# Patient Record
Sex: FEMALE | Race: WHITE | URBAN - METROPOLITAN AREA
[De-identification: names, ages, dates, MRNs, and addresses within clinical notes are randomized per-mention and may not be internally consistent; named-entity substitution may affect disease eponyms.]

---

## 2017-01-03 RX ORDER — SIMVASTATIN 10 MG/1
10 TABLET, FILM COATED ORAL
Qty: 30 TAB | Refills: 1 | Status: SHIPPED | OUTPATIENT
Start: 2017-01-03

## 2017-01-03 RX ORDER — DULOXETIN HYDROCHLORIDE 60 MG/1
CAPSULE, DELAYED RELEASE ORAL
Qty: 30 CAP | Refills: 1 | Status: SHIPPED | OUTPATIENT
Start: 2017-01-03

## 2017-01-26 RX ORDER — MIRTAZAPINE 45 MG/1
TABLET, FILM COATED ORAL
Qty: 30 TAB | Refills: 1 | Status: SHIPPED | OUTPATIENT
Start: 2017-01-26 | End: 2017-03-28 | Stop reason: SDUPTHER

## 2017-10-26 ENCOUNTER — OFFICE VISIT (OUTPATIENT)
Dept: FAMILY MEDICINE CLINIC | Age: 57
End: 2017-10-26

## 2017-10-26 VITALS
RESPIRATION RATE: 20 BRPM | HEART RATE: 98 BPM | WEIGHT: 122.2 LBS | SYSTOLIC BLOOD PRESSURE: 112 MMHG | BODY MASS INDEX: 22.49 KG/M2 | HEIGHT: 62 IN | TEMPERATURE: 98.2 F | OXYGEN SATURATION: 98 % | DIASTOLIC BLOOD PRESSURE: 62 MMHG

## 2017-10-26 DIAGNOSIS — Z12.39 BREAST CANCER SCREENING: ICD-10-CM

## 2017-10-26 DIAGNOSIS — E55.9 HYPOVITAMINOSIS D: ICD-10-CM

## 2017-10-26 DIAGNOSIS — Z12.11 COLON CANCER SCREENING: ICD-10-CM

## 2017-10-26 DIAGNOSIS — Z01.818 PREOP EXAMINATION: Primary | ICD-10-CM

## 2017-10-26 DIAGNOSIS — H54.7 VISION IMPAIRMENT: ICD-10-CM

## 2017-10-26 DIAGNOSIS — E78.00 PURE HYPERCHOLESTEROLEMIA: ICD-10-CM

## 2017-10-26 DIAGNOSIS — Z00.00 ANNUAL PHYSICAL EXAM: ICD-10-CM

## 2017-10-26 DIAGNOSIS — Z78.0 POSTMENOPAUSAL: ICD-10-CM

## 2017-10-26 RX ORDER — DEXTROAMPHETAMINE SACCHARATE, AMPHETAMINE ASPARTATE, DEXTROAMPHETAMINE SULFATE AND AMPHETAMINE SULFATE 5; 5; 5; 5 MG/1; MG/1; MG/1; MG/1
20 TABLET ORAL 2 TIMES DAILY
COMMUNITY

## 2017-10-26 RX ORDER — CLONAZEPAM 0.5 MG/1
TABLET ORAL
COMMUNITY

## 2017-10-26 RX ORDER — LAMOTRIGINE 150 MG/1
TABLET ORAL DAILY
COMMUNITY

## 2017-10-26 NOTE — PATIENT INSTRUCTIONS
High Cholesterol: Care Instructions  Your Care Instructions    Cholesterol is a type of fat in your blood. It is needed for many body functions, such as making new cells. Cholesterol is made by your body. It also comes from food you eat. High cholesterol means that you have too much of the fat in your blood. This raises your risk of a heart attack and stroke. LDL and HDL are part of your total cholesterol. LDL is the \"bad\" cholesterol. High LDL can raise your risk for heart disease, heart attack, and stroke. HDL is the \"good\" cholesterol. It helps clear bad cholesterol from the body. High HDL is linked with a lower risk of heart disease, heart attack, and stroke. Your cholesterol levels help your doctor find out your risk for having a heart attack or stroke. You and your doctor can talk about whether you need to lower your risk and what treatment is best for you. A heart-healthy lifestyle along with medicines can help lower your cholesterol and your risk. The way you choose to lower your risk will depend on how high your risk is for heart attack and stroke. It will also depend on how you feel about taking medicines. Follow-up care is a key part of your treatment and safety. Be sure to make and go to all appointments, and call your doctor if you are having problems. It's also a good idea to know your test results and keep a list of the medicines you take. How can you care for yourself at home? · Eat a variety of foods every day. Good choices include fruits, vegetables, whole grains (like oatmeal), dried beans and peas, nuts and seeds, soy products (like tofu), and fat-free or low-fat dairy products. · Replace butter, margarine, and hydrogenated or partially hydrogenated oils with olive and canola oils. (Canola oil margarine without trans fat is fine.)  · Replace red meat with fish, poultry, and soy protein (like tofu). · Limit processed and packaged foods like chips, crackers, and cookies.   · Bake, broil, or steam foods. Don't valdovinos them. · Be physically active. Get at least 30 minutes of exercise on most days of the week. Walking is a good choice. You also may want to do other activities, such as running, swimming, cycling, or playing tennis or team sports. · Stay at a healthy weight or lose weight by making the changes in eating and physical activity listed above. Losing just a small amount of weight, even 5 to 10 pounds, can reduce your risk for having a heart attack or stroke. · Do not smoke. When should you call for help? Watch closely for changes in your health, and be sure to contact your doctor if:  ? · You need help making lifestyle changes. ? · You have questions about your medicine. Where can you learn more? Go to http://ernie-theodore.info/. Enter T536 in the search box to learn more about \"High Cholesterol: Care Instructions. \"  Current as of: September 21, 2016  Content Version: 11.4  © 3910-4842 Healthwise, Incorporated. Care instructions adapted under license by [employer] (which disclaims liability or warranty for this information). If you have questions about a medical condition or this instruction, always ask your healthcare professional. [place] [address] any warranty or liability for your use of this information.

## 2017-10-26 NOTE — PROGRESS NOTES
Isha Martin is a 62 y.o. female is here for a pre op for brow lift. 1. Have you been to the ER, urgent care clinic since your last visit? Hospitalized since your last visit?urgent care for pulled muscle     2. Have you seen or consulted any other health care providers outside of the [address] [place] [address] since your last visit? Include any pap smears or colon screening. [email or web address]      Health Maintenance Due   Topic Date Due    Hepatitis C Screening  1960    PAP AKA CERVICAL CYTOLOGY  08/04/1981    FOBT Q 1 YEAR AGE 50-75  08/04/2010    INFLUENZA AGE 9 TO ADULT  08/01/2017     Already gpt flu shot.

## 2017-10-26 NOTE — MR AVS SNAPSHOT
Visit Information Date & Time Provider Department Dept. Phone Encounter #  
 10/26/2017 10:15 AM Shakir Carter, [facility] [phone] [id number] Upcoming Health Maintenance Date Due Hepatitis C Screening 1960 PAP AKA CERVICAL CYTOLOGY 8/4/1981 FOBT Q 1 YEAR AGE 50-75 8/4/2010 INFLUENZA AGE 9 TO ADULT 8/1/2017 BREAST CANCER SCRN MAMMOGRAM 4/28/2018 DTaP/Tdap/Td series (2 - Td) 5/13/2020 Allergies as of 10/26/2017  Review Complete On: 10/26/2017 By: Rita Swift LPN Severity Noted Reaction Type Reactions Codeine  03/11/2016    Nausea Only Morphine  03/11/2016    Other (comments)  
 combattive Current Immunizations  Reviewed on 10/26/2017 Name Date Influenza Vaccine 9/20/2017, 8/21/2016, 10/15/2015 Tdap 5/13/2010 Reviewed by Shakir Carter MD on 10/26/2017 at 11:05 AM  
You Were Diagnosed With   
  
 Codes Comments Hypovitaminosis D    -  Primary ICD-10-CM: E55.9 ICD-9-CM: 268.9 Pure hypercholesterolemia     ICD-10-CM: E78.00 ICD-9-CM: 272.0 Annual physical exam     ICD-10-CM: Z00.00 ICD-9-CM: V70.0 Vitals BP Pulse Temp Resp Height(growth percentile) Weight(growth percentile) 112/62 (BP 1 Location: Left arm, BP Patient Position: Sitting) (!) 102 98.2 °F (36.8 °C) (Oral) 20 5' 2\" (1.575 m) 122 lb 3.2 oz (55.4 kg) SpO2 BMI OB Status Smoking Status 98% 22.35 kg/m2 Hysterectomy Former Smoker Vitals History BMI and BSA Data Body Mass Index Body Surface Area  
 22.35 kg/m 2 1.56 m 2 Preferred Pharmacy Pharmacy Name Phone  [facility], [address] [place] [address] [facility] [phone] Your Updated Medication List  
  
   
This list is accurate as of: 10/26/17 11:14 AM.  Always use your most recent med list.  
  
  
  
  
 ADDERALL 20 mg tablet Generic drug:  dextroamphetamine-amphetamine Take 20 mg by mouth two (2) times a day. alendronate 70 mg tablet Commonly known as:  FOSAMAX Take 1 Tab by mouth Every Saturday. clonazePAM 0.5 mg tablet Commonly known as:  Kayley Santos Take  by mouth nightly as needed. DULoxetine 60 mg capsule Commonly known as:  CYMBALTA TAKE ONE CAPSULE BY MOUTH ONE TIME DAILY  
  
 ergocalciferol 50,000 unit capsule Commonly known as:  VITAMIN D2 Take 1 Cap by mouth every seven (7) days. LaMICtal 150 mg tablet Generic drug:  lamoTRIgine Take  by mouth daily. mirtazapine 45 mg tablet Commonly known as:  Sheila Leobardo Take 1 Tab by mouth nightly as needed. simvastatin 10 mg tablet Commonly known as:  ZOCOR Take 1 Tab by mouth nightly. To-Do List   
 10/26/2017 Lab:  CBC WITH AUTOMATED DIFF   
  
 10/26/2017 Lab:  HEPATIC FUNCTION PANEL   
  
 10/26/2017 Lab:  LIPID PANEL   
  
 10/26/2017 Lab:  METABOLIC PANEL, BASIC   
  
 10/26/2017 Lab:  T4, FREE   
  
 10/26/2017 Lab:  TSH 3RD GENERATION   
  
 10/26/2017 Lab:  URINALYSIS W/ RFLX MICROSCOPIC   
  
 10/26/2017 Lab:  VITAMIN D, 25 HYDROXY Patient Instructions High Cholesterol: Care Instructions Your Care Instructions Cholesterol is a type of fat in your blood. It is needed for many body functions, such as making new cells. Cholesterol is made by your body. It also comes from food you eat. High cholesterol means that you have too much of the fat in your blood. This raises your risk of a heart attack and stroke. LDL and HDL are part of your total cholesterol. LDL is the \"bad\" cholesterol. High LDL can raise your risk for heart disease, heart attack, and stroke. HDL is the \"good\" cholesterol. It helps clear bad cholesterol from the body. High HDL is linked with a lower risk of heart disease, heart attack, and stroke.  
Your cholesterol levels help your doctor find out your risk for having a heart attack or stroke. You and your doctor can talk about whether you need to lower your risk and what treatment is best for you. A heart-healthy lifestyle along with medicines can help lower your cholesterol and your risk. The way you choose to lower your risk will depend on how high your risk is for heart attack and stroke. It will also depend on how you feel about taking medicines. Follow-up care is a key part of your treatment and safety. Be sure to make and go to all appointments, and call your doctor if you are having problems. It's also a good idea to know your test results and keep a list of the medicines you take. How can you care for yourself at home? · Eat a variety of foods every day. Good choices include fruits, vegetables, whole grains (like oatmeal), dried beans and peas, nuts and seeds, soy products (like tofu), and fat-free or low-fat dairy products. · Replace butter, margarine, and hydrogenated or partially hydrogenated oils with olive and canola oils. (Canola oil margarine without trans fat is fine.) · Replace red meat with fish, poultry, and soy protein (like tofu). · Limit processed and packaged foods like chips, crackers, and cookies. · Bake, broil, or steam foods. Don't valdovinos them. · Be physically active. Get at least 30 minutes of exercise on most days of the week. Walking is a good choice. You also may want to do other activities, such as running, swimming, cycling, or playing tennis or team sports. · Stay at a healthy weight or lose weight by making the changes in eating and physical activity listed above. Losing just a small amount of weight, even 5 to 10 pounds, can reduce your risk for having a heart attack or stroke. · Do not smoke. When should you call for help? Watch closely for changes in your health, and be sure to contact your doctor if: 
? · You need help making lifestyle changes. ? · You have questions about your medicine. Where can you learn more? Go to http://ernie-theodore.info/. Enter K750 in the search box to learn more about \"High Cholesterol: Care Instructions. \" Current as of: September 21, 2016 Content Version: 11.4 © 0086-8032 Healthwise, [employer]. Care instructions adapted under license by [employer] (which disclaims liability or warranty for this information). If you have questions about a medical condition or this instruction, always ask your healthcare professional. [facility] any warranty or liability for your use of this information. Introducing [facility]! Daniel Ramilaruthie introduces Secondbrain patient portal. Now you can access parts of your medical record, email your doctor's office, and request medication refills online. 1. In your internet browser, go to [email or web address] 2. Click on the First Time User? Click Here link in the Sign In box. You will see the New Member Sign Up page. 3. Enter your Secondbrain Access Code exactly as it appears below. You will not need to use this code after youve completed the sign-up process. If you do not sign up before the expiration date, you must request a new code. · Secondbrain Access Code: [email or web address] Expires: 1/24/2018 11:14 AM 
 
4. Enter the last four digits of your Social Security Number (xxxx) and Date of Birth (mm/dd/yyyy) as indicated and click Submit. You will be taken to the next sign-up page. 5. Create a Secondbrain ID. This will be your Secondbrain login ID and cannot be changed, so think of one that is secure and easy to remember. 6. Create a Second[employer] password. You can change your password at any time. 7. Enter your Password Reset Question and Answer. This can be used at a later time if you forget your password. 8. Enter your e-mail address. You will receive e-mail notification when new information is available in [address] 9. Click Sign Up.  You can now view and download portions of your medical record. 10. Click the Download Summary menu link to download a portable copy of your medical information. If you have questions, please visit the Frequently Asked Questions section of the [employer] website. Remember, [employer] is NOT to be used for urgent needs. For medical emergencies, dial 911. Now available from your iPhone and Android! Please provide this summary of care documentation to your next provider. Your primary care clinician is listed as Tara 51. If you have any questions after today's visit, please call [phone].

## 2017-10-26 NOTE — PROGRESS NOTES
Preoperative Evaluation    Date of Exam: 10/26/2017    Natalie Meraz is a 62 y.o. female (:1960) who presents for preoperative evaluation. Pt will be having a brow ptosis repair of both upper eyelids with Dr. Perez Mclean, on 11/3/17. Procedure will be done at [facility].      Latex Allergy: no    Problem List:     Patient Active Problem List    Diagnosis Date Noted    Osteoporosis 2016    HLD (hyperlipidemia) 2016    Fatty liver 2016    Hypovitaminosis D 2016    Marijuana use 2016    Narcotic abuse 2016    Fibromyalgia 2016    Depression with anxiety 2016     Allergies: Allergies   Allergen Reactions    Codeine Nausea Only    Morphine Other (comments)     combattive      Medications:     Current Outpatient Prescriptions   Medication Sig    dextroamphetamine-amphetamine (ADDERALL) 20 mg tablet Take 20 mg by mouth two (2) times a day.  clonazePAM (KLONOPIN) 0.5 mg tablet Take  by mouth nightly as needed.  lamoTRIgine (LAMICTAL) 150 mg tablet Take  by mouth daily.  DULoxetine (CYMBALTA) 60 mg capsule TAKE ONE CAPSULE BY MOUTH ONE TIME DAILY (Patient taking differently: two (2) times a day. TAKE ONE CAPSULE BY MOUTH ONE TIME DAILY)    simvastatin (ZOCOR) 10 mg tablet Take 1 Tab by mouth nightly.  alendronate (FOSAMAX) 70 mg tablet Take 1 Tab by mouth Every Saturday.  ergocalciferol (VITAMIN D2) 50,000 unit capsule Take 1 Cap by mouth every seven (7) days.  mirtazapine (REMERON) 45 mg tablet Take 1 Tab by mouth nightly as needed. No current facility-administered medications for this visit.       Surgical History:     Past Surgical History:   Procedure Laterality Date    HX GI      HX GYN      complete hysterectomy    HX HEENT      HX ORTHOPAEDIC       Social History:     Social History     Social History    Marital status:      Spouse name: N/A    Number of children: N/A    Years of education: N/A Social History Main Topics    Smoking status: Former Smoker    Smokeless tobacco: Never Used    Alcohol use No    Drug use: Yes     Special: Marijuana, Prescription    Sexual activity: Yes     Other Topics Concern    None     Social History Narrative       Anesthesia Complications: None  History of abnormal bleeding : None  History of Blood Transfusions: no      Objective:     Review of Systems - Negative       Physical Examination: General appearance - alert, well appearing, and in no distress  Chest - clear to auscultation, no wheezes, rales or rhonchi, symmetric air entry  Heart - normal rate, regular rhythm, normal S1, S2, no murmurs, rubs, clicks or gallops  Abdomen - soft, nontender, nondistended, no masses or organomegaly        IMPRESSION:   Low risk for planned surgery  No contraindications to planned surgery.     Rojas Lazo MD   [address] [place] [address] Dr Fernandez [facility], [address] [place] [phone] (office #)  [phone] (fax #)  10/26/2017